# Patient Record
Sex: MALE | URBAN - METROPOLITAN AREA
[De-identification: names, ages, dates, MRNs, and addresses within clinical notes are randomized per-mention and may not be internally consistent; named-entity substitution may affect disease eponyms.]

---

## 2024-02-05 ENCOUNTER — OV NP (OUTPATIENT)
Dept: URBAN - METROPOLITAN AREA CLINIC 111 | Facility: CLINIC | Age: 69
End: 2024-02-05
Payer: MEDICARE

## 2024-02-05 ENCOUNTER — LAB (OUTPATIENT)
Dept: URBAN - METROPOLITAN AREA CLINIC 111 | Facility: CLINIC | Age: 69
End: 2024-02-05

## 2024-02-05 VITALS
WEIGHT: 221 LBS | HEART RATE: 116 BPM | DIASTOLIC BLOOD PRESSURE: 71 MMHG | TEMPERATURE: 97.3 F | HEIGHT: 64 IN | BODY MASS INDEX: 37.73 KG/M2 | SYSTOLIC BLOOD PRESSURE: 106 MMHG

## 2024-02-05 DIAGNOSIS — R93.89 ABNORMAL CT SCAN: ICD-10-CM

## 2024-02-05 DIAGNOSIS — R19.4 CHANGE IN BOWEL HABITS: ICD-10-CM

## 2024-02-05 DIAGNOSIS — Z12.11 COLON CANCER SCREENING: ICD-10-CM

## 2024-02-05 DIAGNOSIS — Z80.0 FAMILY HX OF COLON CANCER: ICD-10-CM

## 2024-02-05 PROBLEM — 129679001: Status: ACTIVE | Noted: 2024-02-05

## 2024-02-05 PROCEDURE — 99244 OFF/OP CNSLTJ NEW/EST MOD 40: CPT | Performed by: PHYSICIAN ASSISTANT

## 2024-02-05 PROCEDURE — 99204 OFFICE O/P NEW MOD 45 MIN: CPT | Performed by: PHYSICIAN ASSISTANT

## 2024-02-05 RX ORDER — CHOLESTYRAMINE 4 G/9G
1 SCOOP POWDER, FOR SUSPENSION ORAL ONCE A DAY
Qty: 30 | Refills: 1 | OUTPATIENT
Start: 2024-02-05

## 2024-02-05 RX ORDER — EMPAGLIFLOZIN 25 MG/1
1 TABLET TABLET, FILM COATED ORAL ONCE A DAY
Status: ACTIVE | COMMUNITY

## 2024-02-05 RX ORDER — ATORVASTATIN CALCIUM 80 MG/1
1 TABLET TABLET, FILM COATED ORAL ONCE A DAY
Status: ACTIVE | COMMUNITY

## 2024-02-05 RX ORDER — SERTRALINE 100 MG/1
1 TABLET TABLET, FILM COATED ORAL ONCE A DAY
Status: ACTIVE | COMMUNITY

## 2024-02-05 RX ORDER — METFORMIN HYDROCHLORIDE 500 MG/1
1 TABLET WITH A MEAL TABLET, FILM COATED ORAL ONCE A DAY
Status: ACTIVE | COMMUNITY

## 2024-02-05 NOTE — HPI-TODAY'S VISIT:
67 y/o male here with diarrhea for 19 days. Wife is with him. This patient was referred by Rufina Lopez NP.  A copy of this will be sent to the referring provider. No recent travel or eating out.  Pt usually has a stool about twice a week. No blood in the stool. Having 3-5 stools a day since diarrhea started. Stool is watery. Pt went to ER at [facility] on 1/24. Stool studies were done and they do have paperwork with them. CT a/p with inflammation in small bowel. C. diff negative. CT a/p is consistent with an acute mild to moderate grade distal small bowel enteritis likely infectious/inflammatory. Mild to moderate constipation with likely associated focal colitis of left colon. Gallstones also noted. He got fluids again with a primary doctor after going to ER d/t dehydration & low BP.  Pt lives in [place] for part of the year and [place] part of the year. Pt was having abd pain that started when he ate. Pain in epigastrium and radiates into lower abd. Little nausea. One episode of vomiting since symptoms started. Not sure about weight loss. No fever but has had some chills. Pt has taken pepto bismol. He has seen some black stools after taking it. He has also taken Imodium and reports no relief with any meds. He has been doing protein drinks. Tried a probiotic which helped some with the cramps. He is very fatigued.  No joint pain.  Pt has never had a colonoscopy. Negative cologuard 1.5 years ago.  Older brother got diagnosed with colon cancer at 71 in the last year and had surgery. He is doing better. Pt's mother just got diagnosed with colon cancer at 91.  No heart or lung problems. 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 No treatment. Sees a heart doctor in [place] and last saw a couple of years ago. Skin cancer on leg that was removed. Not sure what type.

## 2024-02-05 NOTE — PHYSICAL EXAM GASTROINTESTINAL
Abdomen, soft, nontender, nondistended, no guarding or rigidity, no masses palpable, normal bowel sounds, Liver and Spleen, no hepatosplenomegaly

## 2024-03-11 ENCOUNTER — COLON (OUTPATIENT)
Dept: URBAN - METROPOLITAN AREA LAB 3 | Facility: LAB | Age: 69
End: 2024-03-11
Payer: COMMERCIAL

## 2024-03-11 DIAGNOSIS — D12.3 ADENOMA OF TRANSVERSE COLON: ICD-10-CM

## 2024-03-11 DIAGNOSIS — K63.3 INTESTINAL ULCERATION: ICD-10-CM

## 2024-03-11 DIAGNOSIS — K52.89 OTHER SPECIFIED NONINFECTIVE GASTROENTERITIS AND COLITIS: ICD-10-CM

## 2024-03-11 PROCEDURE — 45381 COLONOSCOPY SUBMUCOUS NJX: CPT | Performed by: INTERNAL MEDICINE

## 2024-03-11 PROCEDURE — 45385 COLONOSCOPY W/LESION REMOVAL: CPT | Performed by: INTERNAL MEDICINE

## 2024-03-11 PROCEDURE — 45380 COLONOSCOPY AND BIOPSY: CPT | Performed by: INTERNAL MEDICINE

## 2024-03-25 ENCOUNTER — OV EP (OUTPATIENT)
Dept: URBAN - METROPOLITAN AREA CLINIC 111 | Facility: CLINIC | Age: 69
End: 2024-03-25
Payer: COMMERCIAL

## 2024-03-25 ENCOUNTER — LAB (OUTPATIENT)
Dept: URBAN - METROPOLITAN AREA CLINIC 111 | Facility: CLINIC | Age: 69
End: 2024-03-25

## 2024-03-25 VITALS
TEMPERATURE: 97.7 F | WEIGHT: 210.8 LBS | HEIGHT: 76 IN | SYSTOLIC BLOOD PRESSURE: 124 MMHG | DIASTOLIC BLOOD PRESSURE: 74 MMHG | HEART RATE: 89 BPM | BODY MASS INDEX: 25.67 KG/M2

## 2024-03-25 DIAGNOSIS — Z86.010 HISTORY OF ADENOMATOUS POLYP OF COLON: ICD-10-CM

## 2024-03-25 DIAGNOSIS — R19.7 DIARRHEA, UNSPECIFIED TYPE: ICD-10-CM

## 2024-03-25 DIAGNOSIS — K63.3 ULCERATED COLON: ICD-10-CM

## 2024-03-25 DIAGNOSIS — Z80.0 FAMILY HX OF COLON CANCER: ICD-10-CM

## 2024-03-25 DIAGNOSIS — K52.9 FOCAL ACTIVE COLITIS: ICD-10-CM

## 2024-03-25 PROBLEM — 429047008: Status: ACTIVE | Noted: 2024-03-25

## 2024-03-25 PROCEDURE — 99214 OFFICE O/P EST MOD 30 MIN: CPT | Performed by: PHYSICIAN ASSISTANT

## 2024-03-25 RX ORDER — ATORVASTATIN CALCIUM 80 MG/1
1 TABLET TABLET, FILM COATED ORAL ONCE A DAY
Status: ACTIVE | COMMUNITY

## 2024-03-25 RX ORDER — EMPAGLIFLOZIN 25 MG/1
1 TABLET TABLET, FILM COATED ORAL ONCE A DAY
Status: ACTIVE | COMMUNITY

## 2024-03-25 RX ORDER — METFORMIN HYDROCHLORIDE 500 MG/1
1 TABLET WITH A MEAL TABLET, FILM COATED ORAL ONCE A DAY
Status: ACTIVE | COMMUNITY

## 2024-03-25 RX ORDER — SERTRALINE 100 MG/1
1 TABLET TABLET, FILM COATED ORAL ONCE A DAY
Status: ACTIVE | COMMUNITY

## 2024-03-25 RX ORDER — CHOLESTYRAMINE 4 G/9G
1 SCOOP POWDER, FOR SUSPENSION ORAL ONCE A DAY
Qty: 30 | Refills: 1 | Status: ACTIVE | COMMUNITY
Start: 2024-02-05

## 2024-03-25 NOTE — HPI-TODAY'S VISIT:
67 y/o male here to follow up after colonoscopy. Seen by me on 2/5 for diarrhea and abnormal CT with enteritis & possible focal colitis in L colon. Pt had never had previous colonoscopy and had 2 first degree relatives recently diagnosed with colon cancer. Stool studies were positive for salmonella. Pt was treated with Levaquin and then Amoxicillin. S/p colonoscopy on 3/11 by Dr. Correia revealing one 25 mm polyp, two 10-15 mm polyps, mucosal ulceration in descending & transverse, diverticulosis, and non-bleeding hemorrhoids. Path with tubular adenomas and sessile serrated adenoma. Path also with active colitis with ulceration. CMV negative. Pt is to repeat in 6 months with better bowel prep.  Pt is here with his wife. He is still having diarrhea but it is intermittent. This morning he had a formed stool but then had diarrhea after coming to the office. It is better than it was. No other complaints. They return to [place] tomorrow. No NSAIDs.